# Patient Record
Sex: MALE | Race: BLACK OR AFRICAN AMERICAN | Employment: FULL TIME | URBAN - METROPOLITAN AREA
[De-identification: names, ages, dates, MRNs, and addresses within clinical notes are randomized per-mention and may not be internally consistent; named-entity substitution may affect disease eponyms.]

---

## 2019-04-01 ENCOUNTER — APPOINTMENT (OUTPATIENT)
Dept: GENERAL RADIOLOGY | Age: 49
End: 2019-04-01
Payer: COMMERCIAL

## 2019-04-01 ENCOUNTER — HOSPITAL ENCOUNTER (EMERGENCY)
Age: 49
Discharge: HOME | End: 2019-04-01
Attending: EMERGENCY MEDICINE
Payer: COMMERCIAL

## 2019-04-01 VITALS
SYSTOLIC BLOOD PRESSURE: 192 MMHG | DIASTOLIC BLOOD PRESSURE: 117 MMHG | TEMPERATURE: 98.1 F | OXYGEN SATURATION: 96 % | HEART RATE: 87 BPM | RESPIRATION RATE: 14 BRPM

## 2019-04-01 DIAGNOSIS — M79.674 TOE PAIN, RIGHT: Primary | ICD-10-CM

## 2019-04-01 PROCEDURE — 99283 EMERGENCY DEPT VISIT LOW MDM: CPT

## 2019-04-01 PROCEDURE — 6360000002 HC RX W HCPCS: Performed by: PHYSICIAN ASSISTANT

## 2019-04-01 PROCEDURE — 96372 THER/PROPH/DIAG INJ SC/IM: CPT

## 2019-04-01 PROCEDURE — 73630 X-RAY EXAM OF FOOT: CPT

## 2019-04-01 RX ORDER — DEXAMETHASONE SODIUM PHOSPHATE 4 MG/ML
10 INJECTION, SOLUTION INTRA-ARTICULAR; INTRALESIONAL; INTRAMUSCULAR; INTRAVENOUS; SOFT TISSUE ONCE
Status: COMPLETED | OUTPATIENT
Start: 2019-04-01 | End: 2019-04-01

## 2019-04-01 RX ADMIN — DEXAMETHASONE SODIUM PHOSPHATE 10 MG: 4 INJECTION, SOLUTION INTRAMUSCULAR; INTRAVENOUS at 17:58

## 2019-04-01 ASSESSMENT — PAIN DESCRIPTION - PAIN TYPE: TYPE: ACUTE PAIN

## 2019-04-01 ASSESSMENT — ENCOUNTER SYMPTOMS
COLOR CHANGE: 1
VOMITING: 0
NAUSEA: 0

## 2019-04-01 ASSESSMENT — PAIN SCALES - GENERAL: PAINLEVEL_OUTOF10: 6

## 2019-04-01 ASSESSMENT — PAIN DESCRIPTION - ORIENTATION: ORIENTATION: RIGHT

## 2019-04-01 NOTE — ED TRIAGE NOTES
Pt c/o right foot pain to the outside of his foot. Pt states it started on Friday states that the pain is worse when walking. Denies injury.

## 2019-04-01 NOTE — ED PROVIDER NOTES
[address] [facility] [place] ENCOUNTER          PHYSICIAN ASSISTANT NOTE       Date of evaluation: 4/1/2019    Chief Complaint     Foot Pain      History of Present Illness     Melanie Colby is a 50 y.o. male with a past medical history of peritoneal dialysis as well as hypertension presenting to the emergency department for evaluation of right foot pain. On Friday he developed very mild pain at the base of his pinky toe on his right foot. He denies associated trauma or injury. Throughout the week and his pain worsened and today he was unable to put a shoe or walk long distances due to pain at the base of his pinky toe. He also feels like this portion of his foot/toe swollen. He's had no fevers, chills or myalgias. There is no numbness or tingling in the foot or toe, no trauma to the nail bed, and no drainage or signs of fungal infection    Review of Systems     Review of Systems   Constitutional: Negative for chills, diaphoresis, fatigue and fever. Gastrointestinal: Negative for nausea and vomiting. Musculoskeletal: Positive for joint swelling (right pinky toe). Skin: Positive for color change (redness of toe). Negative for pallor, rash and wound. Neurological: Negative for numbness. Past Medical, Surgical, Family, and Social History     He has a past medical history of Chronic kidney disease and Hypertension. He has a past surgical history that includes other surgical history. His family history is not on file. He reports that he has never smoked. He has never used smokeless tobacco. He reports that he drank alcohol. He reports that he does not use drugs. Medications     Discharge Medication List as of 4/1/2019  5:59 PM      CONTINUE these medications which have NOT CHANGED    Details   valsartan (DIOVAN) 160 MG tablet Take 160 mg by mouth daily. cephALEXin (KEFLEX) 500 MG capsule Take 500 mg by mouth 4 times daily.       ibuprofen (ADVIL;MOTRIN) 800 MG tablet Take 1 tablet by mouth every 8 hours as needed for Pain., Disp-30 tablet, R-0             Allergies     He has No Known Allergies. Physical Exam     INITIAL VITALS: BP: (!) 192/117, Temp: 98.1 °F (36.7 °C), Pulse: 87, Resp: 14, SpO2: 96 %  Physical Exam   Constitutional: He appears well-developed and well-nourished. No distress. HENT:   Head: Normocephalic. Cardiovascular: Normal rate, regular rhythm, normal heart sounds and intact distal pulses. Pulmonary/Chest: Effort normal and breath sounds normal. No respiratory distress. Musculoskeletal:   Tenderness to palpation at the mid MTP of the right 5th digit, mild overlying erythema and swelling extending into the 5th digit, full range of motion of all toes with flexion and extension, no fluctuance or induration, no damage to nailbed   Skin: He is not diaphoretic. Psychiatric: He has a normal mood and affect. His behavior is normal.   Nursing note and vitals reviewed. Diagnostic Results     RADIOLOGY:  XR FOOT RIGHT (MIN 3 VIEWS)   Final Result      No acute bony pathology. Heel spur          LABS:   No results found for this visit on 04/01/19. ED BEDSIDE ULTRASOUND:  none    RECENT VITALS:  BP: (!) 192/117, Temp: 98.1 °F (36.7 °C), Pulse: 87, Resp: 14, SpO2: 96 %     Procedures   none    ED Course     Nursing Notes, Past Medical Hx,Past Surgical Hx, Social Hx, Allergies, and Family Hx were reviewed. The patient was given the following medications:  Orders Placed This Encounter   Medications    dexamethasone (DECADRON) injection 10 mg       CONSULTS:  None    MEDICAL DECISION MAKING / ASSESSMENT / Camden Lesly is a 50 y.o. male presenting to the emergency department for evaluation of atraumatic right fifth toe pain. Pain has been present for the last 3 days it has been worsening. He said that skin is very tender to the touch, and is neurovascularly intact in all digits.  No signs of trauma, no fluctuance, warmth or drainage to suggest

## 2019-04-01 NOTE — ED PROVIDER NOTES
ED Attending Attestation Note     Date of evaluation: 4/1/2019    This patient was seen by the advance practice provider. I have seen and examined the patient, agree with the workup, evaluation, management and diagnosis. The care plan has been discussed. My assessment reveals patient with right foot pain. Xray negative. Does not appear infectious, could be gout. Will refer to podiatry.      Nehemiah Gomez MD  04/01/19 9783

## 2019-07-03 ENCOUNTER — APPOINTMENT (OUTPATIENT)
Dept: GENERAL RADIOLOGY | Age: 49
End: 2019-07-03
Payer: COMMERCIAL

## 2019-07-03 ENCOUNTER — HOSPITAL ENCOUNTER (OUTPATIENT)
Age: 49
Setting detail: OBSERVATION
Discharge: HOME | End: 2019-07-04
Attending: EMERGENCY MEDICINE | Admitting: FAMILY MEDICINE
Payer: COMMERCIAL

## 2019-07-03 DIAGNOSIS — R42 DIZZINESS: ICD-10-CM

## 2019-07-03 DIAGNOSIS — R11.0 NAUSEA: ICD-10-CM

## 2019-07-03 DIAGNOSIS — R55 NEAR SYNCOPE: Primary | ICD-10-CM

## 2019-07-03 DIAGNOSIS — R42 LIGHTHEADEDNESS: ICD-10-CM

## 2019-07-03 PROBLEM — R79.89 ELEVATED TROPONIN: Status: ACTIVE | Noted: 2019-07-03

## 2019-07-03 PROBLEM — N18.6 ESRF (END STAGE RENAL FAILURE) (HCC): Chronic | Status: ACTIVE | Noted: 2019-07-03

## 2019-07-03 PROBLEM — I10 HTN (HYPERTENSION): Chronic | Status: ACTIVE | Noted: 2019-07-03

## 2019-07-03 PROBLEM — R77.8 ELEVATED TROPONIN: Status: ACTIVE | Noted: 2019-07-03

## 2019-07-03 LAB
ALBUMIN SERPL-MCNC: 3.5 G/DL (ref 3.4–5)
ANION GAP SERPL CALCULATED.3IONS-SCNC: 26 MMOL/L (ref 3–16)
BACTERIA: ABNORMAL /HPF
BASOPHILS ABSOLUTE: 0 K/UL (ref 0–0.2)
BASOPHILS RELATIVE PERCENT: 0.4 %
BILIRUBIN URINE: NEGATIVE
BLOOD, URINE: ABNORMAL
BUN BLDV-MCNC: 115 MG/DL (ref 7–20)
CALCIUM SERPL-MCNC: 7.3 MG/DL (ref 8.3–10.6)
CHLORIDE BLD-SCNC: 99 MMOL/L (ref 99–110)
CLARITY: CLEAR
CO2: 15 MMOL/L (ref 21–32)
COLOR: YELLOW
CREAT SERPL-MCNC: 19.6 MG/DL (ref 0.9–1.3)
EKG ATRIAL RATE: 91 BPM
EKG DIAGNOSIS: NORMAL
EKG P AXIS: 31 DEGREES
EKG P-R INTERVAL: 144 MS
EKG Q-T INTERVAL: 396 MS
EKG QRS DURATION: 78 MS
EKG QTC CALCULATION (BAZETT): 487 MS
EKG R AXIS: 32 DEGREES
EKG T AXIS: 71 DEGREES
EKG VENTRICULAR RATE: 91 BPM
EOSINOPHILS ABSOLUTE: 0.3 K/UL (ref 0–0.6)
EOSINOPHILS RELATIVE PERCENT: 2.4 %
EPITHELIAL CELLS, UA: ABNORMAL /HPF
GFR AFRICAN AMERICAN: 3
GFR NON-AFRICAN AMERICAN: 3
GLUCOSE BLD-MCNC: 172 MG/DL (ref 70–99)
GLUCOSE URINE: NEGATIVE MG/DL
HCT VFR BLD CALC: 25.9 % (ref 40.5–52.5)
HEMOGLOBIN: 8.7 G/DL (ref 13.5–17.5)
KETONES, URINE: NEGATIVE MG/DL
LEUKOCYTE ESTERASE, URINE: NEGATIVE
LYMPHOCYTES ABSOLUTE: 1.5 K/UL (ref 1–5.1)
LYMPHOCYTES RELATIVE PERCENT: 13.7 %
MCH RBC QN AUTO: 31.5 PG (ref 26–34)
MCHC RBC AUTO-ENTMCNC: 33.5 G/DL (ref 31–36)
MCV RBC AUTO: 94.1 FL (ref 80–100)
MICROSCOPIC EXAMINATION: YES
MONOCYTES ABSOLUTE: 0.9 K/UL (ref 0–1.3)
MONOCYTES RELATIVE PERCENT: 8 %
NEUTROPHILS ABSOLUTE: 8.2 K/UL (ref 1.7–7.7)
NEUTROPHILS RELATIVE PERCENT: 75.5 %
NITRITE, URINE: NEGATIVE
PDW BLD-RTO: 15.5 % (ref 12.4–15.4)
PH UA: 6 (ref 5–8)
PHOSPHORUS: 13.4 MG/DL (ref 2.5–4.9)
PLATELET # BLD: 284 K/UL (ref 135–450)
PMV BLD AUTO: 7.3 FL (ref 5–10.5)
POTASSIUM SERPL-SCNC: 5.7 MMOL/L (ref 3.5–5.1)
PROTEIN UA: >=300 MG/DL
RBC # BLD: 2.75 M/UL (ref 4.2–5.9)
RBC UA: ABNORMAL /HPF (ref 0–2)
SODIUM BLD-SCNC: 140 MMOL/L (ref 136–145)
SPECIFIC GRAVITY UA: 1.02 (ref 1–1.03)
TROPONIN: 0.09 NG/ML
TROPONIN: 0.1 NG/ML
URINE TYPE: ABNORMAL
UROBILINOGEN, URINE: 0.2 E.U./DL
WBC # BLD: 10.9 K/UL (ref 4–11)
WBC UA: ABNORMAL /HPF (ref 0–5)

## 2019-07-03 PROCEDURE — 85025 COMPLETE CBC W/AUTO DIFF WBC: CPT

## 2019-07-03 PROCEDURE — 99285 EMERGENCY DEPT VISIT HI MDM: CPT

## 2019-07-03 PROCEDURE — 93005 ELECTROCARDIOGRAM TRACING: CPT | Performed by: STUDENT IN AN ORGANIZED HEALTH CARE EDUCATION/TRAINING PROGRAM

## 2019-07-03 PROCEDURE — 84484 ASSAY OF TROPONIN QUANT: CPT

## 2019-07-03 PROCEDURE — 71046 X-RAY EXAM CHEST 2 VIEWS: CPT

## 2019-07-03 PROCEDURE — 80069 RENAL FUNCTION PANEL: CPT

## 2019-07-03 PROCEDURE — 81001 URINALYSIS AUTO W/SCOPE: CPT

## 2019-07-03 RX ORDER — 0.9 % SODIUM CHLORIDE 0.9 %
500 INTRAVENOUS SOLUTION INTRAVENOUS ONCE
Status: DISCONTINUED | OUTPATIENT
Start: 2019-07-03 | End: 2019-07-04

## 2019-07-03 RX ORDER — ONDANSETRON 4 MG/1
4 TABLET, ORALLY DISINTEGRATING ORAL ONCE
Status: DISCONTINUED | OUTPATIENT
Start: 2019-07-03 | End: 2019-07-03

## 2019-07-03 ASSESSMENT — ENCOUNTER SYMPTOMS
SHORTNESS OF BREATH: 0
SORE THROAT: 0
VOMITING: 0
NAUSEA: 1
DIARRHEA: 0
EYE PAIN: 0
CONSTIPATION: 0
COUGH: 0
RHINORRHEA: 0
ABDOMINAL PAIN: 0

## 2019-07-04 VITALS
TEMPERATURE: 97 F | OXYGEN SATURATION: 98 % | SYSTOLIC BLOOD PRESSURE: 169 MMHG | HEIGHT: 73 IN | BODY MASS INDEX: 41.75 KG/M2 | HEART RATE: 84 BPM | DIASTOLIC BLOOD PRESSURE: 109 MMHG | RESPIRATION RATE: 18 BRPM | WEIGHT: 315 LBS

## 2019-07-04 LAB
EKG ATRIAL RATE: 91 BPM
EKG DIAGNOSIS: NORMAL
EKG P AXIS: 31 DEGREES
EKG P-R INTERVAL: 144 MS
EKG Q-T INTERVAL: 396 MS
EKG QRS DURATION: 78 MS
EKG QTC CALCULATION (BAZETT): 487 MS
EKG R AXIS: 32 DEGREES
EKG T AXIS: 71 DEGREES
EKG VENTRICULAR RATE: 91 BPM
HCT VFR BLD CALC: 27.3 % (ref 40.5–52.5)
HEMOGLOBIN: 9.1 G/DL (ref 13.5–17.5)
MCH RBC QN AUTO: 31.3 PG (ref 26–34)
MCHC RBC AUTO-ENTMCNC: 33.4 G/DL (ref 31–36)
MCV RBC AUTO: 93.9 FL (ref 80–100)
PDW BLD-RTO: 15.7 % (ref 12.4–15.4)
PLATELET # BLD: 293 K/UL (ref 135–450)
PMV BLD AUTO: 7.5 FL (ref 5–10.5)
RBC # BLD: 2.9 M/UL (ref 4.2–5.9)
TROPONIN: 0.09 NG/ML
WBC # BLD: 10.3 K/UL (ref 4–11)

## 2019-07-04 PROCEDURE — 6360000002 HC RX W HCPCS: Performed by: FAMILY MEDICINE

## 2019-07-04 PROCEDURE — 2580000003 HC RX 258: Performed by: FAMILY MEDICINE

## 2019-07-04 PROCEDURE — 90945 DIALYSIS ONE EVALUATION: CPT

## 2019-07-04 PROCEDURE — 6370000000 HC RX 637 (ALT 250 FOR IP): Performed by: FAMILY MEDICINE

## 2019-07-04 PROCEDURE — 99214 OFFICE O/P EST MOD 30 MIN: CPT | Performed by: INTERNAL MEDICINE

## 2019-07-04 PROCEDURE — 85027 COMPLETE CBC AUTOMATED: CPT

## 2019-07-04 PROCEDURE — G0378 HOSPITAL OBSERVATION PER HR: HCPCS

## 2019-07-04 PROCEDURE — 96372 THER/PROPH/DIAG INJ SC/IM: CPT

## 2019-07-04 PROCEDURE — 36415 COLL VENOUS BLD VENIPUNCTURE: CPT

## 2019-07-04 PROCEDURE — 84484 ASSAY OF TROPONIN QUANT: CPT

## 2019-07-04 RX ORDER — PANTOPRAZOLE SODIUM 40 MG/1
40 TABLET, DELAYED RELEASE ORAL
Status: DISCONTINUED | OUTPATIENT
Start: 2019-07-04 | End: 2019-07-04 | Stop reason: HOSPADM

## 2019-07-04 RX ORDER — CARVEDILOL 25 MG/1
25 TABLET ORAL 2 TIMES DAILY
Qty: 60 TABLET | Refills: 0
Start: 2019-07-04

## 2019-07-04 RX ORDER — SODIUM CHLORIDE 0.9 % (FLUSH) 0.9 %
10 SYRINGE (ML) INJECTION PRN
Status: DISCONTINUED | OUTPATIENT
Start: 2019-07-04 | End: 2019-07-04 | Stop reason: HOSPADM

## 2019-07-04 RX ORDER — TAMSULOSIN HYDROCHLORIDE 0.4 MG/1
0.4 CAPSULE ORAL DAILY
COMMUNITY

## 2019-07-04 RX ORDER — SODIUM CHLORIDE 0.9 % (FLUSH) 0.9 %
10 SYRINGE (ML) INJECTION EVERY 12 HOURS SCHEDULED
Status: DISCONTINUED | OUTPATIENT
Start: 2019-07-04 | End: 2019-07-04 | Stop reason: HOSPADM

## 2019-07-04 RX ORDER — OMEPRAZOLE 20 MG/1
20 CAPSULE, DELAYED RELEASE ORAL PRN
COMMUNITY

## 2019-07-04 RX ORDER — ASPIRIN 81 MG/1
81 TABLET, CHEWABLE ORAL DAILY
Status: DISCONTINUED | OUTPATIENT
Start: 2019-07-04 | End: 2019-07-04 | Stop reason: HOSPADM

## 2019-07-04 RX ORDER — OLMESARTAN MEDOXOMIL AND HYDROCHLOROTHIAZIDE 20/12.5 20; 12.5 MG/1; MG/1
1 TABLET ORAL DAILY
Qty: 90 TABLET | Refills: 0
Start: 2019-07-04

## 2019-07-04 RX ORDER — HEPARIN SODIUM 5000 [USP'U]/ML
5000 INJECTION, SOLUTION INTRAVENOUS; SUBCUTANEOUS EVERY 8 HOURS SCHEDULED
Status: DISCONTINUED | OUTPATIENT
Start: 2019-07-04 | End: 2019-07-04 | Stop reason: HOSPADM

## 2019-07-04 RX ORDER — ONDANSETRON 2 MG/ML
4 INJECTION INTRAMUSCULAR; INTRAVENOUS EVERY 6 HOURS PRN
Status: DISCONTINUED | OUTPATIENT
Start: 2019-07-04 | End: 2019-07-04 | Stop reason: HOSPADM

## 2019-07-04 RX ORDER — ATORVASTATIN CALCIUM 40 MG/1
40 TABLET, FILM COATED ORAL NIGHTLY
Status: DISCONTINUED | OUTPATIENT
Start: 2019-07-04 | End: 2019-07-04 | Stop reason: HOSPADM

## 2019-07-04 RX ORDER — ACETAMINOPHEN 325 MG/1
650 TABLET ORAL EVERY 4 HOURS PRN
Status: DISCONTINUED | OUTPATIENT
Start: 2019-07-04 | End: 2019-07-04 | Stop reason: HOSPADM

## 2019-07-04 RX ADMIN — HEPARIN SODIUM 5000 UNITS: 5000 INJECTION INTRAVENOUS; SUBCUTANEOUS at 06:30

## 2019-07-04 RX ADMIN — Medication 10 ML: at 09:35

## 2019-07-04 RX ADMIN — ASPIRIN 81 MG 81 MG: 81 TABLET ORAL at 09:35

## 2019-07-04 RX ADMIN — PANTOPRAZOLE SODIUM 40 MG: 40 TABLET, DELAYED RELEASE ORAL at 06:28

## 2019-07-04 RX ADMIN — ACETAMINOPHEN 650 MG: 325 TABLET ORAL at 06:28

## 2019-07-04 ASSESSMENT — PAIN - FUNCTIONAL ASSESSMENT: PAIN_FUNCTIONAL_ASSESSMENT: ACTIVITIES ARE NOT PREVENTED

## 2019-07-04 ASSESSMENT — PAIN DESCRIPTION - PROGRESSION: CLINICAL_PROGRESSION: GRADUALLY WORSENING

## 2019-07-04 ASSESSMENT — PAIN SCALES - GENERAL
PAINLEVEL_OUTOF10: 0
PAINLEVEL_OUTOF10: 1

## 2019-07-04 ASSESSMENT — PAIN DESCRIPTION - FREQUENCY: FREQUENCY: CONTINUOUS

## 2019-07-04 ASSESSMENT — PAIN DESCRIPTION - LOCATION: LOCATION: HEAD

## 2019-07-04 ASSESSMENT — PAIN DESCRIPTION - PAIN TYPE: TYPE: ACUTE PAIN

## 2019-07-04 ASSESSMENT — PAIN DESCRIPTION - ONSET: ONSET: ON-GOING

## 2019-07-04 ASSESSMENT — PAIN DESCRIPTION - DESCRIPTORS: DESCRIPTORS: ACHING

## 2019-07-04 NOTE — CONSULTS
Spoke with ER  ESRD: PD arranged for tonight  Full consult to follow in am    Thanks  Nephrology  [address], [address]  Office: [phone]  Cell: [phone]  Fax: [phone]

## 2019-07-04 NOTE — ED NOTES
Patient waiting on admission bed. Jarred Salazar in Peritoneal Dialyses aware.      Helen Boyd, NIRMAL  07/04/19 4601
Rate 91 BPM    Atrial Rate 91 BPM    P-R Interval 144 ms    QRS Duration 78 ms    Q-T Interval 396 ms    QTc Calculation (Bazett) 487 ms    P Axis 31 degrees    R Axis 32 degrees    T Axis 71 degrees    Diagnosis       EKG performed in ER and to be interpreted by ER physician. Confirmed by MD, ER ([id number]),  Emmanuel Sheth ([phone]) on 7/3/2019 7:26:02 PM         RECENT VITALS:  BP: 98/60, Temp: 98.1 °F (36.7 °C),Pulse: 92, Resp: 18, SpO2: 90 %         ED Course     Nursing Notes, Past Medical Hx, Past Surgical Hx, Social Hx, Allergies, and FamilyHx were reviewed. The patient was giventhe following medications:  Orders Placed This Encounter   Medications    0.9 % sodium chloride bolus    DISCONTD: ondansetron (ZOFRAN-ODT) disintegrating tablet 4 mg       CONSULTS:  CHRISTIANO Lloyd / ASSESSMENT / Heather Chen is a 50 y.o. male with a history of HTN and CKD on Peritoneal Dialysis, who presents with dizziness/light-headedness and fainting, for 1-2 hours. He states his nephrologist (Dr. Jonathon Mckeon) changed some of his blood pressure medications today. Troponin was 0.10 at 9pm, being trended. EKG shows no acute changes. Plan to admit for observation and cardiac work-up. Nephrologist on call was contacted about performing peritoneal dialysis tonight, and he is getting it ordered. This patient was also evaluated by the attending physician. All care plans were discussed and agreed upon. Clinical Impression     1. Near syncope    2. Dizziness    3. Nausea    4. Lightheadedness        Disposition     PATIENT REFERRED TO:  No follow-up provider specified.     DISCHARGE MEDICATIONS:  New Prescriptions    No medications on file       DISPOSITION    - Admit to medicine     Carlos Snow MD  Resident  07/03/19 2740

## 2019-07-04 NOTE — H&P
no acute ST elevations. Viewed by me       Assessment/Plan:   Principal Problem:    Dizzy and near Syncope. Likely related to BP med changes, orthostatic hypotension or arrhythmia  Active Problems:    ESRF (end stage renal failure) on PD    Elevated troponin due to renal failure    HTN (hypertension)    Cycle trop. Monitor telemetry. Hold antihypertensives. Check orthostatics. Continue PD per nephrology. Consult Cardiology. Admit as Observation . I anticipate hospitalization spanning less than two midnights for investigation and treatment of the above medically necessary diagnoses.       Lauryn Hayden MD    7/4/2019 1:28 AM

## 2019-07-04 NOTE — PROGRESS NOTES
Patient discharged to private residence. Discharge paperwork discussed with patient by Dr. Janeen Felty and this RN. IV removed. Tele removed. Patient ambulatory and walked self down to own vehicle.

## 2019-07-13 NOTE — ED PROVIDER NOTES
family history is not on file. He reports that he has never smoked. He has never used smokeless tobacco. He reports that he drank alcohol. He reports that he does not use drugs.     Medications           Previous Medications     CEPHALEXIN (KEFLEX) 500 MG CAPSULE    Take 500 mg by mouth 4 times daily.     IBUPROFEN (ADVIL;MOTRIN) 800 MG TABLET    Take 1 tablet by mouth every 8 hours as needed for Pain.     VALSARTAN (DIOVAN) 160 MG TABLET    Take 160 mg by mouth daily.         Allergies      He has No Known Allergies.     Physical Exam      INITIAL VITALS: BP: 98/60, Temp: 98.1 °F (36.7 °C), Pulse: 92, Resp: 18, SpO2: 90 %   Physical Exam   Constitutional: He is oriented to person, place, and time. He appears well-developed. No distress. HENT:   Head: Normocephalic and atraumatic. Eyes: Conjunctivae and EOM are normal.   Neck: Normal range of motion. Neck supple. Cardiovascular: Normal rate, regular rhythm and normal heart sounds. Exam reveals no gallop and no friction rub. No murmur heard. Pulmonary/Chest: Effort normal and breath sounds normal. No stridor. No respiratory distress. He has no wheezes. He has no rales. Abdominal: Soft. There is no tenderness. There is no guarding. Musculoskeletal: Normal range of motion. He exhibits edema. He exhibits no tenderness. Neurological: He is alert and oriented to person, place, and time. Skin: Skin is warm and dry. He is not diaphoretic.    LE edema.     Diagnostic Results      EKG   Interpreted inconjunction with emergency department physician Luisa Pineda MD  Rhythm: normal sinus   Rate: normal  Axis: normal  Ectopy: none  Conduction: normal  ST Segments: no acute change  T Waves: no acute change  Q Waves: none  Clinical Impression: no acute changes  Comparison:  none     RADIOLOGY:  XR CHEST STANDARD (2 VW)   Final Result       No lobar consolidation.             LABS:          Results for orders placed or performed during the hospital encounter of

## 2019-08-02 PROBLEM — R79.89 ELEVATED TROPONIN: Status: RESOLVED | Noted: 2019-07-03 | Resolved: 2019-08-02

## 2019-08-02 PROBLEM — R77.8 ELEVATED TROPONIN: Status: RESOLVED | Noted: 2019-07-03 | Resolved: 2019-08-02

## 2019-10-29 ENCOUNTER — HOSPITAL ENCOUNTER (EMERGENCY)
Age: 49
Discharge: HOME | End: 2019-10-29
Attending: EMERGENCY MEDICINE
Payer: COMMERCIAL

## 2019-10-29 VITALS
WEIGHT: 310 LBS | SYSTOLIC BLOOD PRESSURE: 177 MMHG | TEMPERATURE: 98.4 F | BODY MASS INDEX: 41.08 KG/M2 | HEART RATE: 79 BPM | OXYGEN SATURATION: 99 % | HEIGHT: 73 IN | RESPIRATION RATE: 19 BRPM | DIASTOLIC BLOOD PRESSURE: 117 MMHG

## 2019-10-29 DIAGNOSIS — I10 ASYMPTOMATIC HYPERTENSION: ICD-10-CM

## 2019-10-29 DIAGNOSIS — I10 UNCONTROLLED HYPERTENSION: Primary | ICD-10-CM

## 2019-10-29 LAB
ANION GAP SERPL CALCULATED.3IONS-SCNC: 18 MMOL/L (ref 3–16)
BUN BLDV-MCNC: 74 MG/DL (ref 7–20)
CALCIUM SERPL-MCNC: 10.4 MG/DL (ref 8.3–10.6)
CHLORIDE BLD-SCNC: 93 MMOL/L (ref 99–110)
CO2: 22 MMOL/L (ref 21–32)
CREAT SERPL-MCNC: 21 MG/DL (ref 0.9–1.3)
GFR AFRICAN AMERICAN: 3
GFR NON-AFRICAN AMERICAN: 2
GLUCOSE BLD-MCNC: 93 MG/DL (ref 70–99)
MAGNESIUM: 2.8 MG/DL (ref 1.8–2.4)
POTASSIUM REFLEX MAGNESIUM: 5.6 MMOL/L (ref 3.5–5.1)
SODIUM BLD-SCNC: 133 MMOL/L (ref 136–145)

## 2019-10-29 PROCEDURE — 99283 EMERGENCY DEPT VISIT LOW MDM: CPT

## 2019-10-29 PROCEDURE — 83735 ASSAY OF MAGNESIUM: CPT

## 2019-10-29 PROCEDURE — 80048 BASIC METABOLIC PNL TOTAL CA: CPT

## 2019-10-29 PROCEDURE — 93005 ELECTROCARDIOGRAM TRACING: CPT | Performed by: EMERGENCY MEDICINE

## 2019-10-29 RX ORDER — SUCROFERRIC OXYHYDROXIDE 500 MG/1
TABLET, CHEWABLE ORAL
Refills: 6 | COMMUNITY
Start: 2019-09-11

## 2019-10-29 RX ORDER — CALCIUM ACETATE 667 MG/1
CAPSULE ORAL
Refills: 1 | COMMUNITY
Start: 2019-08-26 | End: 2019-10-29

## 2019-10-29 RX ORDER — MULTIVIT-MIN/IRON/FOLIC ACID/K 18-600-40
CAPSULE ORAL
COMMUNITY
Start: 2018-11-30

## 2019-10-29 RX ORDER — CINACALCET 90 MG/1
TABLET, FILM COATED ORAL
Refills: 6 | COMMUNITY
Start: 2019-08-16

## 2019-10-29 RX ORDER — OMEPRAZOLE 20 MG/1
20 CAPSULE, DELAYED RELEASE ORAL
COMMUNITY
End: 2019-10-29

## 2019-10-29 RX ORDER — TORSEMIDE 20 MG/1
40 TABLET ORAL
COMMUNITY
Start: 2019-07-03

## 2019-10-29 RX ORDER — NIFEDIPINE 60 MG/1
60 TABLET, FILM COATED, EXTENDED RELEASE ORAL
COMMUNITY
Start: 2019-07-03

## 2019-10-30 LAB
EKG ATRIAL RATE: 85 BPM
EKG DIAGNOSIS: NORMAL
EKG P AXIS: 37 DEGREES
EKG P-R INTERVAL: 152 MS
EKG Q-T INTERVAL: 366 MS
EKG QRS DURATION: 80 MS
EKG QTC CALCULATION (BAZETT): 435 MS
EKG R AXIS: 13 DEGREES
EKG T AXIS: 54 DEGREES
EKG VENTRICULAR RATE: 85 BPM

## 2019-11-05 ASSESSMENT — ENCOUNTER SYMPTOMS
COLOR CHANGE: 0
VOMITING: 0
ABDOMINAL PAIN: 0
CHEST TIGHTNESS: 0
RHINORRHEA: 0
DIARRHEA: 0
SORE THROAT: 0
COUGH: 0
EYE PAIN: 0
WHEEZING: 0
ABDOMINAL DISTENTION: 0
TROUBLE SWALLOWING: 0
SHORTNESS OF BREATH: 0
NAUSEA: 0

## 2020-01-02 ENCOUNTER — TELEPHONE (OUTPATIENT)
Dept: BARIATRICS/WEIGHT MGMT | Age: 50
End: 2020-01-02

## 2020-01-02 NOTE — TELEPHONE ENCOUNTER
Zach Soriano from Dr. Mame Julien office referring the patient for weight loss/surgery option. I called and left a message for patient to return our call to schedule at one of the locations with Dr. Mariel Jolly.

## 2020-02-27 ENCOUNTER — TELEPHONE (OUTPATIENT)
Dept: BARIATRICS/WEIGHT MGMT | Age: 50
End: 2020-02-27

## 2021-02-07 ENCOUNTER — APPOINTMENT (OUTPATIENT)
Dept: GENERAL RADIOLOGY | Age: 51
End: 2021-02-07
Payer: COMMERCIAL

## 2021-02-07 ENCOUNTER — HOSPITAL ENCOUNTER (EMERGENCY)
Age: 51
Discharge: HOME | End: 2021-02-07
Attending: STUDENT IN AN ORGANIZED HEALTH CARE EDUCATION/TRAINING PROGRAM
Payer: COMMERCIAL

## 2021-02-07 VITALS
HEART RATE: 84 BPM | OXYGEN SATURATION: 99 % | RESPIRATION RATE: 16 BRPM | SYSTOLIC BLOOD PRESSURE: 145 MMHG | TEMPERATURE: 98.4 F | DIASTOLIC BLOOD PRESSURE: 99 MMHG

## 2021-02-07 DIAGNOSIS — M70.22 OLECRANON BURSITIS OF LEFT ELBOW: Primary | ICD-10-CM

## 2021-02-07 PROCEDURE — 99281 EMR DPT VST MAYX REQ PHY/QHP: CPT

## 2021-02-07 PROCEDURE — 73080 X-RAY EXAM OF ELBOW: CPT

## 2021-02-07 RX ORDER — NAPROXEN 500 MG/1
500 TABLET ORAL 2 TIMES DAILY WITH MEALS
Qty: 20 TABLET | Refills: 1 | Status: SHIPPED | OUTPATIENT
Start: 2021-02-07

## 2021-02-07 ASSESSMENT — ENCOUNTER SYMPTOMS
BACK PAIN: 0
SHORTNESS OF BREATH: 0
RESPIRATORY NEGATIVE: 1

## 2021-02-07 NOTE — ED PROVIDER NOTES
and oriented to person, place, and time. Mental status is at baseline. Psychiatric:         Mood and Affect: Mood normal.         Behavior: Behavior normal.         Thought Content: Thought content normal.         Judgment: Judgment normal.         Diagnostic Results       RADIOLOGY:  XR ELBOW LEFT (MIN 3 VIEWS)   Final Result      No acute fracture seen. LABS:   No results found for this visit on 02/07/21. ED BEDSIDE ULTRASOUND:      RECENT VITALS:  BP: (!) 145/99, Temp: 98.4 °F (36.9 °C), Pulse: 84, Resp: 16, SpO2: 99 %     Procedures         ED Course     Nursing Notes, Past Medical Hx,Past Surgical Hx, Social Hx, Allergies, and Family Hx were reviewed. The patient was given the following medications:  Orders Placed This Encounter   Medications    naproxen (NAPROSYN) 500 MG tablet     Sig: Take 1 tablet by mouth 2 times daily (with meals)     Dispense:  20 tablet     Refill:  1       CONSULTS:  None    MEDICAL DECISION MAKING / ASSESSMENT / Derryl Carly is a 48 y.o. male with elbow pain. Patient is well-appearing and in no acute distress. Vitals are normal the exception of hypertension. Patient has a focal area of pain and tenderness to the left elbow. There is no erythema or warmth to suggest cellulitis. Full range of motion with suggests against septic arthritis. No trauma. Patient is neurovascularly intact. No edema to suggest DVT. X-ray shows no acute abnormality. Bedside ultrasound by Dr. Narendra Mckinley suggests bursitis. The patient be treated with anti-inflammatories. Close follow-up with primary care. Return precautions discussed. This patient was also evaluated by the attending physician. All care plans were discussed and agreed upon. Clinical Impression     1.  Olecranon bursitis of left elbow        Disposition     PATIENT REFERRED TO:  Felicitas Boxer Frias    Schedule an appointment as soon as possible for a visit       [facility], [facility]. Emergency [address]  [phone]    As needed, If symptoms worsen      DISCHARGE MEDICATIONS:  Discharge Medication List as of 2/7/2021  7:16 PM      START taking these medications    Details   naproxen (NAPROSYN) 500 MG tablet Take 1 tablet by mouth 2 times daily (with meals), Disp-20 tablet, R-1Print             DISPOSITION Decision To Discharge 02/07/2021 07:12:57 PM        Quang John PA-C  02/07/21 1944

## 2021-02-08 NOTE — ED NOTES
Discharge instructions reviewed with pt. He verbalized understanding. Pt left the unit ambulatory to return home via private car.       Sahara Kent RN  02/07/21 Jesus Liz

## 2021-02-08 NOTE — ED PROVIDER NOTES
ED Attending Attestation Note     Date of evaluation: 2/7/2021    This patient was seen by the advance practice provider. I have seen and examined the patient, agree with the workup, evaluation, management and diagnosis. The care plan has been discussed. My assessment reveals 47 y/o M who presents for left elbow swelling and tenderness for the last couple of days. He does not recall any trauma. Denies fever, chills, red streaking, numbness, or weakness in the arm. VSS and afebrile, pleasant, no acute distress. Patient has isolated area of swelling over left olecranon. No overlying erythema. Elbow ROM intact. Distal pulse intact. LUE fistula with palpable thrill. Xray negative for acute process. Bedside ultrasound performed shows fluid collection in distribution of olecranon bursa. Presentation favors olecranon bursitis. Considered septic joint, but do not feel this is likely as patient is afebrile/nontoxic, has full ROM, and the swelling is isolated with no overlying erythema. Will treat conservatively with short course of anti-inflammatory and have him follow up with PCP. ED precautions for worsening symptoms.      Mariel Jensen MD  02/07/21 1950

## 2021-10-15 ENCOUNTER — CLINICAL DOCUMENTATION (OUTPATIENT)
Dept: OTHER | Age: 51
End: 2021-10-15